# Patient Record
(demographics unavailable — no encounter records)

---

## 2024-11-04 NOTE — PHYSICAL EXAM
[Normal Coordination] : normal coordination [Normal Sensation] : normal sensation [Normal Mood and Affect] : normal mood and affect [Oriented] : oriented [Able to Communicate] : able to communicate [Well Nourished] : well nourished [4___] : hamstring 4[unfilled]/5 [Left] : left knee [Lateral] : lateral [[place]] : [place] [AP Standing] : anteroposterior standing [] : no extensor lag [FreeTextEntry9] : superolateral patella avulsion fracture, well aligned and healed [TWNoteComboBox7] : flexion 95 degrees

## 2024-11-04 NOTE — DISCUSSION/SUMMARY
[de-identified] : We discussed that patient could benefit from viscosupplementation, formal PT, a home exercise program, ice therapy and the role of NSAIDS for the pain. These modalities will improve the patient's functionality in their activities of daily life.  Risks, benefits and contraindications were discussed.  The patient will follow up in 6 weeks or sooner as needed.

## 2024-11-04 NOTE — HISTORY OF PRESENT ILLNESS
[de-identified] : Patient states that she is having instability in her LT knee, patient states that her knee is buckling and is scared she is going to fall. Patient has been going to PT and doing HEP. Patient takes Aleve prn.